# Patient Record
Sex: FEMALE | Race: WHITE | Employment: UNEMPLOYED | URBAN - METROPOLITAN AREA
[De-identification: names, ages, dates, MRNs, and addresses within clinical notes are randomized per-mention and may not be internally consistent; named-entity substitution may affect disease eponyms.]

---

## 2017-02-20 ENCOUNTER — TELEPHONE (OUTPATIENT)
Dept: SURGERY | Facility: CLINIC | Age: 42
End: 2017-02-20

## 2017-02-20 NOTE — TELEPHONE ENCOUNTER
Called patient and gave her a rough estimate for her surgery left upper eyelid ptosis repair CPT: 04780 that was approved by insurance. She was questioning the price because of a high deductible. Gave her the prices that are associated with the code for both the HB and PB side and let her know that it did not include anesthesia.

## 2017-09-26 DIAGNOSIS — N20.0 CALCULUS OF KIDNEY: Primary | ICD-10-CM

## 2017-10-17 ENCOUNTER — HOSPITAL ENCOUNTER (OUTPATIENT)
Dept: MAMMOGRAPHY | Facility: CLINIC | Age: 42
Discharge: HOME | End: 2017-10-17
Attending: PHYSICIAN ASSISTANT | Admitting: PHYSICIAN ASSISTANT
Payer: COMMERCIAL

## 2017-10-17 DIAGNOSIS — Z12.31 VISIT FOR SCREENING MAMMOGRAM: ICD-10-CM

## 2017-10-17 PROCEDURE — G0202 SCR MAMMO BI INCL CAD: HCPCS

## 2017-11-01 ENCOUNTER — HOSPITAL ENCOUNTER (OUTPATIENT)
Dept: GENERAL RADIOLOGY | Facility: CLINIC | Age: 42
Discharge: HOME | End: 2017-11-01
Attending: UROLOGY | Admitting: UROLOGY
Payer: COMMERCIAL

## 2017-11-01 ENCOUNTER — OFFICE VISIT (OUTPATIENT)
Dept: UROLOGY | Facility: CLINIC | Age: 42
End: 2017-11-01
Payer: COMMERCIAL

## 2017-11-01 VITALS
BODY MASS INDEX: 22.76 KG/M2 | HEART RATE: 80 BPM | HEIGHT: 67 IN | DIASTOLIC BLOOD PRESSURE: 68 MMHG | WEIGHT: 145 LBS | SYSTOLIC BLOOD PRESSURE: 124 MMHG

## 2017-11-01 DIAGNOSIS — N20.0 CALCULUS OF KIDNEY: ICD-10-CM

## 2017-11-01 DIAGNOSIS — Z87.442 HISTORY OF RENAL CALCULI: Primary | ICD-10-CM

## 2017-11-01 LAB
ALBUMIN UR-MCNC: NEGATIVE MG/DL
APPEARANCE UR: CLEAR
BILIRUB UR QL STRIP: NEGATIVE
COLOR UR AUTO: YELLOW
GLUCOSE UR STRIP-MCNC: NEGATIVE MG/DL
HGB UR QL STRIP: ABNORMAL
KETONES UR STRIP-MCNC: NEGATIVE MG/DL
LEUKOCYTE ESTERASE UR QL STRIP: ABNORMAL
NITRATE UR QL: NEGATIVE
PH UR STRIP: 6 PH (ref 5–7)
SOURCE: ABNORMAL
SP GR UR STRIP: 1.02 (ref 1–1.03)
UROBILINOGEN UR STRIP-ACNC: 0.2 EU/DL (ref 0.2–1)

## 2017-11-01 PROCEDURE — 81003 URINALYSIS AUTO W/O SCOPE: CPT | Performed by: UROLOGY

## 2017-11-01 PROCEDURE — 99212 OFFICE O/P EST SF 10 MIN: CPT | Performed by: UROLOGY

## 2017-11-01 PROCEDURE — 74010 XR KUB: CPT | Mod: 52

## 2017-11-01 ASSESSMENT — PAIN SCALES - GENERAL: PAINLEVEL: MODERATE PAIN (4)

## 2017-11-01 NOTE — PROGRESS NOTES
Leah Gentile is a pleasant 42-year-old female with a history of calcium urolithiasis. She's had no flank pain or hematuria. She has a urinalysis that is normal except for a specific gravity of 1.020. In the past she had a 24-hour urine collection that was normal except for low urine volume. She's been trying to drink more water. Her only complaint is low lumbar pain which is more or less chronic. She works out regularly.  Other past medical history: Chronic pain, hypertension, migraines, ovarian cysts, thyroid disease, history of tuberculosis, nonsmoker  Medications: Probiotic, fish oil, turmeric/curcumin  Allergies: None  Exam: Normal appearance, normal vital signs, alert and oriented, normocephalic, normal respirations, neuro grossly intact  Assessment: Calcium urolithiasis-will need to follow stones on today's KUB. 2 small areas of stones in the upper and lower pole of the right kidney. Many pelvic phleboliths  Plan: Drink more water. See me with KUB in 12 months

## 2017-11-01 NOTE — MR AVS SNAPSHOT
"              After Visit Summary   11/1/2017    Leah Gentile    MRN: [id number]           Patient Information     Date Of Birth          1975        Visit Information        Provider Department      11/1/2017 10:00 AM Young Leonard MD [facility]        Today's Diagnoses     History of renal calculi    -  1       Follow-ups after your visit        Follow-up notes from your care team     Return in about 1 year (around 11/1/2018) for KUB.      Future tests that were ordered for you today     Open Future Orders        Priority Expected Expires Ordered    XR KUB [[id number]] Routine 11/1/2018 11/1/2018 11/1/2017            Who to contact     If you have questions or need follow up information about today's clinic visit or your schedule please contact [facility] directly at [phone].  Normal or non-critical lab and imaging results will be communicated to you by MyChart, letter or phone within 4 business days after the clinic has received the results. If you do not hear from us within 7 days, please contact the clinic through Cloudkick[employer] or phone. If you have a critical or abnormal lab result, we will notify you by phone as soon as possible.  Submit refill requests through B-Obvious or call your pharmacy and they will forward the refill request to us. Please allow 3 business days for your refill to be completed.          Additional Information About Your Visit        MyChart Information     B-Obvious lets you send messages to your doctor, view your test results, renew your prescriptions, schedule appointments and more. To sign up, go to [email or web address] . Click on \"Log in\" on the left side of the screen, which will take you to the Welcome page. Then click on \"Sign up Now\" on the right side of the page.     You will be asked to enter the access code listed below, as well as some personal information. Please follow the " "directions to create your username and password.     Your access code is: [email or web address]  Expires: 2018 10:20 AM     Your access code will  in 90 days. If you need help or a new code, please call your [facility] clinic or [phone].        Care EveryWhere ID     This is your Care EveryWhere ID. This could be used by other organizations to access your [facility] medical records  [id number]        Your Vitals Were     Pulse Height BMI (Body Mass Index)             80 1.702 m (5' 7\") 22.71 kg/m2          Blood Pressure from Last 3 Encounters:   17 124/68   10/28/16 110/60   16 (!) 140/97    Weight from Last 3 Encounters:   17 65.8 kg (145 lb)   16 65.8 kg (145 lb)   11/04/15 67.1 kg (148 lb)              We Performed the Following     UA without Microscopic        Primary Care Provider Office Phone # Fax #    Amy Newby PA-C [phone]       [facility] [address]  [facility] [address]        Equal Access to Services     GLENN MCCALL : Hadii marty breeno [employer], waaxda luqadaha, qaybta kaalmada adeegyada, jerilyn wilson . So [facility] [phone].    ATENCIÓN: Si habla español, tiene a ramos disposición servicios gratuitos de asistencia lingüística. Llame al [phone].    We comply with applicable federal civil rights laws and [place] laws. We do not discriminate on the basis of race, color, national origin, age, disability, sex, sexual orientation, or gender identity.            Thank you!     Thank you for choosing [facility] [place]  for your care. Our goal is always to provide you with excellent care. Hearing back from our patients is one way we can continue to improve our services. Please take a few minutes to complete the written survey that you may receive in the mail after your visit with us. Thank you!             Your Updated Medication List - Protect others around you: Learn " how to safely use, store and throw away your medicines at www.disposemymeds.org.          This list is accurate as of: 11/1/17 10:20 AM.  Always use your most recent med list.                   Brand Name Dispense Instructions for use Diagnosis    OMEGA-3 FISH OIL PO      Take 1 g by mouth 2 times daily (with meals)        PROBIOTIC DAILY PO      Take 1 tablet by mouth daily        Turmeric Curcumin Caps      Take 1 tablet by mouth 2 times daily

## 2017-11-01 NOTE — NURSING NOTE
Chief Complaint   Patient presents with     Clinic Care Coordination - Follow-up     History of Kidney Stones     Kailey Dumas LPN

## 2017-11-01 NOTE — LETTER
11/1/2017       RE: Leah Gentile  [address]  [facility] [address]     Dear Colleague,    Thank you for referring your patient, Leah Gentile, to the [facility] [place] [facility]. Please see a copy of my visit note below.    Leah Gentile is a pleasant 42-year-old female with a history of calcium urolithiasis. She's had no flank pain or hematuria. She has a urinalysis that is normal except for a specific gravity of 1.020. In the past she had a 24-hour urine collection that was normal except for low urine volume. She's been trying to drink more water. Her only complaint is low lumbar pain which is more or less chronic. She works out regularly.  Other past medical history: Chronic pain, hypertension, migraines, ovarian cysts, thyroid disease, history of tuberculosis, nonsmoker  Medications: Probiotic, fish oil, turmeric/curcumin  Allergies: None  Exam: Normal appearance, normal vital signs, alert and oriented, normocephalic, normal respirations, neuro grossly intact  Assessment: Calcium urolithiasis-will need to follow stones on today's KUB. 2 small areas of stones in the upper and lower pole of the right kidney. Many pelvic phleboliths  Plan: Drink more water. See me with KUB in 12 months    Again, thank you for allowing me to participate in the care of your patient.      Sincerely,    Young Leonard MD

## 2018-10-17 DIAGNOSIS — N20.0 KIDNEY STONE: Primary | ICD-10-CM

## 2018-10-19 ENCOUNTER — HOSPITAL ENCOUNTER (OUTPATIENT)
Dept: MAMMOGRAPHY | Facility: CLINIC | Age: 43
Discharge: HOME | End: 2018-10-19
Attending: PHYSICIAN ASSISTANT | Admitting: PHYSICIAN ASSISTANT
Payer: COMMERCIAL

## 2018-10-19 DIAGNOSIS — Z12.31 VISIT FOR SCREENING MAMMOGRAM: ICD-10-CM

## 2018-10-19 PROCEDURE — 77063 BREAST TOMOSYNTHESIS BI: CPT
